# Patient Record
Sex: FEMALE | Race: WHITE | Employment: UNEMPLOYED | URBAN - METROPOLITAN AREA
[De-identification: names, ages, dates, MRNs, and addresses within clinical notes are randomized per-mention and may not be internally consistent; named-entity substitution may affect disease eponyms.]

---

## 2024-10-26 ENCOUNTER — APPOINTMENT (OUTPATIENT)
Dept: GENERAL RADIOLOGY | Age: 54
End: 2024-10-26
Payer: COMMERCIAL

## 2024-10-26 ENCOUNTER — HOSPITAL ENCOUNTER (EMERGENCY)
Age: 54
Discharge: HOME | End: 2024-10-26
Attending: STUDENT IN AN ORGANIZED HEALTH CARE EDUCATION/TRAINING PROGRAM
Payer: COMMERCIAL

## 2024-10-26 ENCOUNTER — APPOINTMENT (OUTPATIENT)
Dept: CT IMAGING | Age: 54
End: 2024-10-26
Payer: COMMERCIAL

## 2024-10-26 VITALS
OXYGEN SATURATION: 96 % | HEIGHT: 67 IN | TEMPERATURE: 97 F | DIASTOLIC BLOOD PRESSURE: 79 MMHG | HEART RATE: 88 BPM | RESPIRATION RATE: 18 BRPM | WEIGHT: 222.88 LBS | BODY MASS INDEX: 34.98 KG/M2 | SYSTOLIC BLOOD PRESSURE: 128 MMHG

## 2024-10-26 DIAGNOSIS — T14.8XXA ABRASION: ICD-10-CM

## 2024-10-26 DIAGNOSIS — S09.90XA INJURY OF HEAD, INITIAL ENCOUNTER: ICD-10-CM

## 2024-10-26 DIAGNOSIS — S01.81XA FACIAL LACERATION, INITIAL ENCOUNTER: Primary | ICD-10-CM

## 2024-10-26 DIAGNOSIS — E04.1 THYROID NODULE: ICD-10-CM

## 2024-10-26 PROCEDURE — 73590 X-RAY EXAM OF LOWER LEG: CPT

## 2024-10-26 PROCEDURE — 90471 IMMUNIZATION ADMIN: CPT | Performed by: STUDENT IN AN ORGANIZED HEALTH CARE EDUCATION/TRAINING PROGRAM

## 2024-10-26 PROCEDURE — 90715 TDAP VACCINE 7 YRS/> IM: CPT | Performed by: STUDENT IN AN ORGANIZED HEALTH CARE EDUCATION/TRAINING PROGRAM

## 2024-10-26 PROCEDURE — 73562 X-RAY EXAM OF KNEE 3: CPT

## 2024-10-26 PROCEDURE — 99284 EMERGENCY DEPT VISIT MOD MDM: CPT

## 2024-10-26 PROCEDURE — 70450 CT HEAD/BRAIN W/O DYE: CPT

## 2024-10-26 PROCEDURE — 6360000002 HC RX W HCPCS: Performed by: STUDENT IN AN ORGANIZED HEALTH CARE EDUCATION/TRAINING PROGRAM

## 2024-10-26 PROCEDURE — 12011 RPR F/E/E/N/L/M 2.5 CM/<: CPT

## 2024-10-26 PROCEDURE — 72125 CT NECK SPINE W/O DYE: CPT

## 2024-10-26 RX ORDER — TRAZODONE HYDROCHLORIDE 100 MG/1
100 TABLET ORAL NIGHTLY
COMMUNITY
Start: 2024-02-05

## 2024-10-26 RX ORDER — GABAPENTIN 300 MG/1
300 CAPSULE ORAL 2 TIMES DAILY
COMMUNITY
Start: 2024-08-22

## 2024-10-26 RX ORDER — TRAMADOL HYDROCHLORIDE 50 MG/1
50 TABLET ORAL 3 TIMES DAILY PRN
COMMUNITY
Start: 2024-09-18 | End: 2024-12-17

## 2024-10-26 RX ORDER — LISINOPRIL 5 MG/1
5 TABLET ORAL DAILY
COMMUNITY

## 2024-10-26 RX ORDER — ATORVASTATIN CALCIUM 40 MG/1
40 TABLET, FILM COATED ORAL DAILY
COMMUNITY
Start: 2024-07-18

## 2024-10-26 RX ORDER — OXCARBAZEPINE 150 MG/1
150 TABLET, FILM COATED ORAL 2 TIMES DAILY
COMMUNITY
Start: 2024-09-25

## 2024-10-26 RX ORDER — LURASIDONE HYDROCHLORIDE 20 MG/1
TABLET, FILM COATED ORAL
COMMUNITY

## 2024-10-26 RX ORDER — SERTRALINE HYDROCHLORIDE 20 MG/ML
25 SOLUTION ORAL DAILY
COMMUNITY

## 2024-10-26 RX ORDER — TRIAMTERENE AND HYDROCHLOROTHIAZIDE 37.5; 25 MG/1; MG/1
CAPSULE ORAL
COMMUNITY
Start: 2024-09-13

## 2024-10-26 RX ADMIN — TETANUS TOXOID, REDUCED DIPHTHERIA TOXOID AND ACELLULAR PERTUSSIS VACCINE, ADSORBED 0.5 ML: 5; 2.5; 8; 8; 2.5 SUSPENSION INTRAMUSCULAR at 21:02

## 2024-10-26 ASSESSMENT — PAIN DESCRIPTION - LOCATION
LOCATION: HEAD
LOCATION: HEAD

## 2024-10-26 ASSESSMENT — LIFESTYLE VARIABLES
HOW MANY STANDARD DRINKS CONTAINING ALCOHOL DO YOU HAVE ON A TYPICAL DAY: PATIENT DOES NOT DRINK
HOW OFTEN DO YOU HAVE A DRINK CONTAINING ALCOHOL: NEVER

## 2024-10-26 ASSESSMENT — PAIN - FUNCTIONAL ASSESSMENT: PAIN_FUNCTIONAL_ASSESSMENT: 0-10

## 2024-10-26 ASSESSMENT — PAIN SCALES - GENERAL
PAINLEVEL_OUTOF10: 8
PAINLEVEL_OUTOF10: 8

## 2024-10-26 ASSESSMENT — PAIN DESCRIPTION - DESCRIPTORS: DESCRIPTORS: THROBBING

## 2024-10-26 ASSESSMENT — PAIN DESCRIPTION - ORIENTATION: ORIENTATION: LEFT;ANTERIOR

## 2024-10-26 NOTE — ED TRIAGE NOTES
Patient ambulatory to ED bed 3, assisted by spouse, c/o fall, stepped on rock, fell injuring both knees and hitting head.  Patient denies any LOC, denies vision changes, reports she has tremors baseline when \"excited.\"  Patient noted to have laceration to left forehead, approx 1 inch long, abrasions to bilateral knees.  She denies any back or neck pain, denies weakness.

## 2024-10-26 NOTE — ED NOTES
Patient to imaging via wheelchair by sergei Razo.  Patient is in no apparent distress at this time.  Will continue to monitor.

## 2024-10-26 NOTE — DISCHARGE INSTRUCTIONS
Today you are seen here emergency department after a fall.  Your CT scans and x-rays can be seen below.  Please return for new/worsening symptoms, otherwise need to monitor your laceration for signs of infection, and then follow-up with a primary care doctor in 5 to 7 days for suture removal.  Your CT scan also revealed evidence of a thyroid nodule this needs to be evaluated by your primary care doctor please call and make an appointment.    CT CERVICAL SPINE WO CONTRAST   Final Result   No acute abnormality of the head and cervical spine.      Nonemergent ultrasound recommended to further evaluate a 1.9 cm right thyroid   nodule.      RECOMMENDATIONS:   Managing Incidental Thyroid Nodule Detected at CT or MRI or US      Further evaluation by thyroid ultrasound recommended for:      - Patient Age 35 years or more - Nodule 1.5 cm in size or greater      - Heterogeneous, enlarged thyroid gland.      - Increased uptake on PET      Reference: Recommendations for f/u of Incidental Thyroid Nodules (ITN) found   on CT, MR, NM and Extrathyroidal US are based upon the ACR white paper and   [facility] 3-tiered system for managing ITNs:J Am Wan Radiol. 2015 Feb;12(2):   143-50         CT HEAD WO CONTRAST   Final Result   No acute abnormality of the head and cervical spine.      Nonemergent ultrasound recommended to further evaluate a 1.9 cm right thyroid   nodule.      RECOMMENDATIONS:   Managing Incidental Thyroid Nodule Detected at CT or MRI or US      Further evaluation by thyroid ultrasound recommended for:      - Patient Age 35 years or more - Nodule 1.5 cm in size or greater      - Heterogeneous, enlarged thyroid gland.      - Increased uptake on PET      Reference: Recommendations for f/u of Incidental Thyroid Nodules (ITN) found   on CT, MR, NM and Extrathyroidal US are based upon the ACR white paper and   [facility] 3-tiered system for managing ITNs:J Am Wan Radiol. 2015 Feb;12(2):   143-50         XR KNEE RIGHT (3 VIEWS)

## 2024-10-26 NOTE — ED PROVIDER NOTES
EMERGENCY DEPARTMENT ENCOUNTER      CHIEF COMPLAINT    Fall (Patient to ED w/ c/o fall, hit head, laceration to left side of forehead, scraped knees), Laceration, and Knee Injury      HPI    Pam Dumont is a 54 y.o. female with no past medical history who presents with fall, forehead laceration, bilateral knee pain   Just prior to arrival patient was walking down a step when she slipped on a rock fell forward struck her right forehead on the concrete as well as causing abrasions to the bilateral knees denies LOC or vomiting denies any near syncopal-like symptoms before the fall reports very much mechanical mechanism  She otherwise tells me her last tetanus shot was likely more than 5 years ago  Denies fever chills  Normal state of health prior to the fall      PAST MEDICAL HISTORY    No past medical history on file.    SURGICAL HISTORY    No past surgical history on file.    CURRENT MEDICATIONS    Current Outpatient Rx   Medication Sig Dispense Refill    atorvastatin (LIPITOR) 40 MG tablet Take 1 tablet by mouth daily      OXcarbazepine (TRILEPTAL) 150 MG tablet Take 1 tablet by mouth 2 times daily      omeprazole (PRILOSEC) 20 MG delayed release capsule 1 capsule      sertraline (ZOLOFT) 20 MG/ML concentrated solution Take 1.25 mLs by mouth daily      gabapentin (NEURONTIN) 300 MG capsule Take 1 capsule by mouth 2 times daily.      traMADol (ULTRAM) 50 MG tablet Take 1 tablet by mouth 3 times daily as needed. Max Daily Amount: 150 mg      triamterene-hydroCHLOROthiazide (DYAZIDE) 37.5-25 MG per capsule       insulin lispro protamine & lispro (HUMALOG MIX) (75-25) 100 UNIT per ML SUSP injection vial Inject into the skin 2 times daily (with meals)      traZODone (DESYREL) 100 MG tablet Take 1 tablet by mouth nightly      lurasidone (LATUDA) 20 MG TABS tablet TAKE 1 TABLET (20 MG TOTAL) BY MOUTH DAILY WITH DINNER. INDICATIONS: BIPOLAR DISORDER      lisinopril (PRINIVIL;ZESTRIL) 5 MG tablet Take 1 tablet by  answered. I reviewed the patients medical records and noted there allergies, past medical history, and previous visits, pertinent information summarized in HPI. I reviewed the nursing notes.    Feel that patient is appropriate for discharge to follow up with PCP. Patient agreeable with this plan. Return precautions given. Patient discharged in stable condition.          Cesar Lo MD  10/26/24 2044

## 2024-10-27 NOTE — ED NOTES
Patient stops at bathroom prior to returning to room.  She ambulates with SBA, tolerates well, is in no apparent distress at this time.

## 2024-10-27 NOTE — ED NOTES
Patient's left forehead laceration and bilateral knees cleaned with chlorhexidine and NS.  She tolerated well.